# Patient Record
Sex: MALE | Race: WHITE | Employment: FULL TIME | URBAN - METROPOLITAN AREA
[De-identification: names, ages, dates, MRNs, and addresses within clinical notes are randomized per-mention and may not be internally consistent; named-entity substitution may affect disease eponyms.]

---

## 2019-09-26 ENCOUNTER — HOSPITAL ENCOUNTER (OUTPATIENT)
Age: 44
Discharge: HOME | End: 2019-09-28
Payer: COMMERCIAL

## 2019-09-26 PROCEDURE — 82785 ASSAY OF IGE: CPT

## 2019-09-26 PROCEDURE — 86003 ALLG SPEC IGE CRUDE XTRC EA: CPT

## 2019-10-03 LAB
Lab: NORMAL
REPORT: NORMAL
THIS TEST SENT TO: NORMAL

## 2021-02-09 DIAGNOSIS — G56.01 CARPAL TUNNEL SYNDROME OF RIGHT WRIST: Primary | ICD-10-CM

## 2021-02-10 ENCOUNTER — OFFICE VISIT (OUTPATIENT)
Dept: PHYSICAL MEDICINE AND REHAB | Age: 46
End: 2021-02-10
Payer: COMMERCIAL

## 2021-02-10 VITALS — BODY MASS INDEX: 30.36 KG/M2 | HEIGHT: 69 IN | WEIGHT: 205 LBS

## 2021-02-10 DIAGNOSIS — G56.01 CARPAL TUNNEL SYNDROME OF RIGHT WRIST: Primary | ICD-10-CM

## 2021-02-10 PROCEDURE — 99242 OFF/OP CONSLTJ NEW/EST SF 20: CPT | Performed by: PHYSICAL MEDICINE & REHABILITATION

## 2021-02-10 PROCEDURE — 95886 MUSC TEST DONE W/N TEST COMP: CPT | Performed by: PHYSICAL MEDICINE & REHABILITATION

## 2021-02-10 PROCEDURE — 95909 NRV CNDJ TST 5-6 STUDIES: CPT | Performed by: PHYSICAL MEDICINE & REHABILITATION

## 2021-02-10 NOTE — PATIENT INSTRUCTIONS
Electrodiagnotic Laboratory  Accredited by the [facility] with Exemplary status  SUSAN Lentz D.O. [facility]  [address]  Phone: [phone]  Fax: [phone]        Today you had an electrodiagnostic exam which included nerve conduction studies (NCS) and electromyography (EMG). This test evaluated the electrical activity of your nerves and muscles to help determine if you have a nerve or muscle disease. This test can help determine the location and type of a nerve or muscle problem. This will help your referring doctor diagnose your condition and determine the appropriate next step in your treatment plan. After your test:    1. There are no long lasting side effects of the test.     2. You may resume your normal activities without restrictions. 3.  Resume any medications that were stopped for the test.     4  If you have sore areas or bruising in your muscles where the needle was placed, apply a cold pack to the sore area for 15-20 minutes three to four times a day as needed for pain. The soreness should go away in about 1-2 days. 5. Your results were provided  Briefly at the end of your test and the final detailed report will be provided to your referring physician, and/or primary care physician and any other parties you requested within 1-2 days of the examination. You may wish to contact your referring provider after a few days to determine what they would like you to do next. 6.  Please call [phone] with any questions or concerns and if you develop increased body temperature/fever, swelling, tenderness, increased pain and/or drainage from the sites where the needle was placed. Thank you for choosing us for your health care needs.

## 2021-02-10 NOTE — PROGRESS NOTES
[address] [facility]  Electrodiagnostic Laboratory  *Accredited by the [address] [place] [address] with exemplary status  [address]  Phone: [phone]  Fax: [phone]    Referring Provider: Thomas Camacho CNP  Primary Care Physician: Chen Peng DO  Patient Name: Sarah Londono  Patient YOB: 1975  Gender: male  BMI: There is no height or weight on file to calculate BMI. There were no vitals taken for this visit. 2/10/2021    Description of clinical problem:   Chief Complaint   Patient presents with    Extremity Pain     sharp pain in wrist, eblow and shoulder. pain 4-5/10. pain with movement. 6years. no acute injury.  Numbness     sitting on elbow will cause numbness.  Extremity Weakness     pain will cause a decrease in  strength     Sensory NCS      Nerve / Sites Rec. Site Peak Lat PP Amp Segments Distance Velocity Temp. ms µV  cm m/s °C   R Median - Digit II (Antidromic)      Palm Dig II 1.93 18.8 Palm - Dig II 7 64 33.5      Wrist Dig II 3.91 14.2 Wrist - Dig II 14 46 33.5   R Ulnar - Digit V (Antidromic)      Wrist Dig V 3.28 15.9 Wrist - Dig V 14 55 33.6   R Radial - Anatomical snuff box (Forearm)      Forearm Wrist 2.29 13.0 Forearm - Wrist 10 60 33.8   R Dorsal ulnar cutaneous - Hand dorsum (Forearm)      Forearm Hand dorsum 2.19 25.6 Forearm - Hand dorsum 8 55 33.8       Motor NCS      Nerve / Sites Muscle Onset Amplitude Segments Distance Velocity Temp.     ms mV  cm m/s °C   R Median - APB      Palm APB 1.98 13.2 Palm - APB   33.3      Wrist APB 3.54 12.2 Wrist - Palm 8 51 33.6      Elbow APB 7.19 11.9 Elbow - Wrist 21 58 33.6   R Ulnar - ADM      Wrist ADM 2.55 15.5 Wrist - ADM 8  33.6      B. Elbow ADM 5.78 15.1 B. Elbow - Wrist 20 62 33.6      A. Elbow ADM 7.55 14.1 A. Elbow - B. Elbow 10 56 33.6   L Ulnar - ADM      Wrist ADM 2.19 14.5 Wrist - ADM 8  34      B. Elbow ADM 5.83 13.9 B. Elbow - Wrist 20 55 34.1 A.Elbow ADM 7.71 13.8 A. Elbow - B. Elbow 10 53 34.3       F Wave      Nerve Fmin % F    ms %   R Median - APB 28.33 90   R Ulnar - ADM 28.07 100   L Ulnar - ADM 28.75 100       EMG      EMG Summary Table     Spontaneous MUAP Recruitment   Muscle Nerve Roots IA Fib PSW Fasc Amp Dur. PPP Pattern   R. Biceps brachii Musculocutaneous C5-C6 N None None None N N N N   R. Triceps brachii Radial C6-C8 N None None None N N N N   R. Pronator teres Median C6-C7 N None None None N N N N   R. First dorsal interosseous Ulnar C8-T1 N None None None N N N N   R. Abductor pollicis brevis Median K8-T4 N None None None N N N N     Study Limitations:  none    Summary of Findings:   Nerve conduction studies:   · All nerve conduction studies listed in the table above were normal in latency, amplitude and conduction velocity. Needle EMG:   · Needle EMG was performed using a concentric needle. ? All muscles tested, as listed in the table above demonstrated normal amplitude, duration, phases and recruitment and no active denervation signs were seen. Diagnostic Interpretation: This study was normal.     Previous Study: no      Follow up EMG is recommended if clinically warranted. Technologist: Rodrigo Pedraza  Physician:    Annette Cope D.O., P.T. Board Certified Physical Medicine and Rehabilitation  Board Certified Electrodiagnostic Medicine      Nerve conduction studies and electromyography were performed according to our laboratory policies and procedures which can be provided upon request. All abnormal values are identified in the table.  Laboratory normal values can also be provided upon request.       Cc: IRWIN Senior DO

## 2021-02-11 NOTE — PROGRESS NOTES
[address] [facility]  Electrodiagnostic Laboratory  *Accredited by the [address] [place] [address] with exemplary status  [address]  Phone: [phone]  Fax: [phone]      Date of Examination: 02/11/21  Patient Name: Marielena Mendiola  is a 54y.o. year old male who was seen due to complaints of   Chief Complaint   Patient presents with    Extremity Pain     sharp pain in wrist, eblow and shoulder. pain 4-5/10. pain with movement. 6years. no acute injury.  Numbness     sitting on elbow will cause numbness.  Extremity Weakness     pain will cause a decrease in  strength     Physical Exam: MSK: There is no joint effusion, deformity, instability, swelling, erythema or warmth. AROM is full in the spine and extremities. Negative Tinel. Neurologic:  No focal sensorimotor deficit. Reflexes 2+ and symmetric. Gait is normal.    Impression:     1. Carpal tunnel syndrome of right wrist        Plan:   · EMG is indicated to evaluate the above diagnosis. Orders Placed This Encounter   Procedures    WY NEEDLE EMG EA EXTREMTY W/PARASPINL AREA COMPLETE    WY MOTOR &/SENS 5-6 NRV CNDJ PRECONF ELTRODE LIMB     · EMG was done today and showed a normal study. The patient was educated about the diagnosis and the prognosis. · Advised patient to follow up with referring provider. Thank you for allowing me to participate in the care of your patient.       Sincerely,     Louie Kenny

## 2021-02-12 DIAGNOSIS — G56.01 CARPAL TUNNEL SYNDROME OF RIGHT WRIST: ICD-10-CM

## 2024-04-26 ENCOUNTER — TRANSCRIBE ORDERS (OUTPATIENT)
Dept: ADMINISTRATIVE | Age: 49
End: 2024-04-26

## 2024-04-26 DIAGNOSIS — S46.811A TRAUMATIC RUPTURE OF SUPRASPINATUS TENDON OF RIGHT SHOULDER, INITIAL ENCOUNTER: Primary | ICD-10-CM

## 2024-05-04 ENCOUNTER — HOSPITAL ENCOUNTER (OUTPATIENT)
Dept: MRI IMAGING | Age: 49
End: 2024-05-04
Payer: OTHER MISCELLANEOUS

## 2024-05-04 DIAGNOSIS — S46.811A TRAUMATIC RUPTURE OF SUPRASPINATUS TENDON OF RIGHT SHOULDER, INITIAL ENCOUNTER: ICD-10-CM

## 2024-05-04 PROCEDURE — 73221 MRI JOINT UPR EXTREM W/O DYE: CPT

## 2025-02-11 ENCOUNTER — TELEPHONE (OUTPATIENT)
Dept: ENT CLINIC | Age: 50
End: 2025-02-11

## 2025-02-11 ENCOUNTER — OFFICE VISIT (OUTPATIENT)
Dept: ENT CLINIC | Age: 50
End: 2025-02-11
Payer: COMMERCIAL

## 2025-02-11 VITALS
TEMPERATURE: 97.6 F | DIASTOLIC BLOOD PRESSURE: 92 MMHG | BODY MASS INDEX: 28.71 KG/M2 | HEART RATE: 106 BPM | OXYGEN SATURATION: 97 % | WEIGHT: 193.8 LBS | HEIGHT: 69 IN | SYSTOLIC BLOOD PRESSURE: 138 MMHG

## 2025-02-11 DIAGNOSIS — K21.9 GASTROESOPHAGEAL REFLUX DISEASE, UNSPECIFIED WHETHER ESOPHAGITIS PRESENT: Primary | ICD-10-CM

## 2025-02-11 DIAGNOSIS — R06.2 WHEEZING: ICD-10-CM

## 2025-02-11 DIAGNOSIS — R09.82 POST-NASAL DRAINAGE: ICD-10-CM

## 2025-02-11 DIAGNOSIS — J30.9 ALLERGIC RHINITIS, UNSPECIFIED SEASONALITY, UNSPECIFIED TRIGGER: ICD-10-CM

## 2025-02-11 PROCEDURE — 99204 OFFICE O/P NEW MOD 45 MIN: CPT | Performed by: NURSE PRACTITIONER

## 2025-02-11 RX ORDER — FAMOTIDINE 10 MG
10 TABLET ORAL NIGHTLY PRN
COMMUNITY

## 2025-02-11 RX ORDER — AZELASTINE 1 MG/ML
2 SPRAY, METERED NASAL 2 TIMES DAILY
Qty: 30 ML | Refills: 1 | Status: SHIPPED | OUTPATIENT
Start: 2025-02-11

## 2025-02-11 RX ORDER — LISINOPRIL 20 MG/1
20 TABLET ORAL DAILY
COMMUNITY

## 2025-02-11 ASSESSMENT — ENCOUNTER SYMPTOMS
SINUS PRESSURE: 0
SINUS PAIN: 0
TROUBLE SWALLOWING: 1
STRIDOR: 0
WHEEZING: 1
SHORTNESS OF BREATH: 0
VOICE CHANGE: 0
RHINORRHEA: 1
EYES NEGATIVE: 1

## 2025-02-11 NOTE — PROGRESS NOTES
DEPARTMENT OF OTOLARYNGOLOGY  JACQUELYN LoyolaO., Ms. Ed.  JACQUELYN BeaulieuO.  Shad Trotter, MSN, FNP-C        Patient Name:  Juanjose Kelsey Jr  :  1975     CHIEF C/O:    Chief Complaint   Patient presents with    New Patient     NP PND, HX of allergy injs for 1 year, had a nasal swab and came back with streptococcus pneumoniae, and acinetobacter baumannii        HISTORY OBTAINED FROM:  patient    HISTORY OF PRESENT ILLNESS:       Juanjose is a 49 y.o. year old male, here today for:       Recurrent sinus infections, postnasal drainage, acid reflux and wheezing.  Patient states his symptoms have been present for many years.  He does have a long history of acid reflux with previous follow-ups with gastroenterology and dilations of the esophagus.  He is also been diagnosed with a hiatal hernia.  He currently takes Prilosec daily for acid reflux and denies any current noticeable acid reflux symptoms.  He also complains of persistent congestion with postnasal drainage symptoms.  He has tried Flonase in the past with Allegra-D but states no significant improvement when taking the medications.  He is also on inhalers for asthma due to wheezing.  He states that the wheezing comes from his throat and not his lungs and does not always see a significant amount of benefit from the inhalers.  He denies any recent fevers or recent antibiotics but has had frequent treatment of sinus infections in the past.  Denies any ear pain or pressure.  He denies any current difficulty swallowing or hoarseness.  He denies any tobacco history.           Past Medical History:   Diagnosis Date    Asthma     from allergies    Hypertension      History reviewed. No pertinent surgical history.    Current Outpatient Medications:     lisinopril (PRINIVIL;ZESTRIL) 20 MG tablet, Take 1 tablet by mouth daily, Disp: , Rfl:     famotidine (PEPCID) 10 MG tablet, Take 1 tablet by mouth nightly as needed, Disp: , Rfl:

## 2025-02-11 NOTE — TELEPHONE ENCOUNTER
Patient asking if you would send in refills for his Albuterol. You are not the prescriber for this so I did not send them.

## 2025-02-11 NOTE — TELEPHONE ENCOUNTER
Patient left voicemail regarding Omeprazole dose. States you advised him to increase to 40mg however that is the dose he is currently taking.

## 2025-02-11 NOTE — TELEPHONE ENCOUNTER
Called patient and advised he needs to call the prescribing physician for albuterol refill. Patient apologized and said he thought he left that message at his PCP office.

## 2025-02-11 NOTE — TELEPHONE ENCOUNTER
Called patient and advised per Soren Trotter:   Tell him to continue with that does then with the addition of gaviscon at bedtime as discussed.       Patient verbalized understanding

## 2025-02-24 ENCOUNTER — TELEPHONE (OUTPATIENT)
Dept: ENDOSCOPY | Age: 50
End: 2025-02-24

## 2025-02-26 ENCOUNTER — TELEPHONE (OUTPATIENT)
Dept: ENDOSCOPY | Age: 50
End: 2025-02-26

## 2025-03-26 ENCOUNTER — OFFICE VISIT (OUTPATIENT)
Dept: ENT CLINIC | Age: 50
End: 2025-03-26
Payer: COMMERCIAL

## 2025-03-26 VITALS
BODY MASS INDEX: 29.33 KG/M2 | WEIGHT: 198 LBS | DIASTOLIC BLOOD PRESSURE: 88 MMHG | SYSTOLIC BLOOD PRESSURE: 156 MMHG | HEART RATE: 71 BPM | HEIGHT: 69 IN

## 2025-03-26 DIAGNOSIS — R06.2 WHEEZING: ICD-10-CM

## 2025-03-26 DIAGNOSIS — R09.82 POST-NASAL DRAINAGE: ICD-10-CM

## 2025-03-26 DIAGNOSIS — K21.9 GASTROESOPHAGEAL REFLUX DISEASE, UNSPECIFIED WHETHER ESOPHAGITIS PRESENT: Primary | ICD-10-CM

## 2025-03-26 DIAGNOSIS — T78.1XXA GASTROINTESTINAL FOOD SENSITIVITY: ICD-10-CM

## 2025-03-26 DIAGNOSIS — J30.9 ALLERGIC RHINITIS, UNSPECIFIED SEASONALITY, UNSPECIFIED TRIGGER: ICD-10-CM

## 2025-03-26 PROCEDURE — 99213 OFFICE O/P EST LOW 20 MIN: CPT | Performed by: NURSE PRACTITIONER

## 2025-03-26 RX ORDER — SIMETHICONE 40MG/0.6ML
30 SUSPENSION, DROPS(FINAL DOSAGE FORM)(ML) ORAL 2 TIMES DAILY PRN
Qty: 355 ML | Refills: 1 | Status: SHIPPED | OUTPATIENT
Start: 2025-03-26 | End: 2025-04-25

## 2025-03-26 RX ORDER — FLUTICASONE PROPIONATE AND SALMETEROL 250; 50 UG/1; UG/1
POWDER RESPIRATORY (INHALATION)
COMMUNITY
Start: 2025-03-10

## 2025-03-26 RX ORDER — NEBIVOLOL 10 MG/1
10 TABLET ORAL DAILY
COMMUNITY
Start: 2025-03-10

## 2025-03-26 ASSESSMENT — ENCOUNTER SYMPTOMS
RHINORRHEA: 0
VOICE CHANGE: 0
EYES NEGATIVE: 1
WHEEZING: 1
STRIDOR: 0
TROUBLE SWALLOWING: 1
SINUS PAIN: 0
SINUS PRESSURE: 0
SHORTNESS OF BREATH: 0

## 2025-03-26 NOTE — PROGRESS NOTES
DEPARTMENT OF OTOLARYNGOLOGY  JACQUELYN LoyolaO., Ms. Ed.  JACQUELYN BeaulieuO.  Shad Trotter, MSN, FNP-C        Patient Name:  Juanjose Kelsey Jr  :  1975     CHIEF C/O:    Chief Complaint   Patient presents with    Follow-up     6 wk PND       HISTORY OBTAINED FROM:  patient    HISTORY OF PRESENT ILLNESS:       Juanjose is a 49 y.o. year old male, here today for follow up of:       History of Present Illness  The patient presents for evaluation of acid reflux, migraines, and respiratory issues.    He has been managing his acid reflux with omeprazole and Gaviscon, which he finds beneficial. Despite these treatments, he continues to experience gas and acid reflux, regardless of his dietary intake. He abstained from beer for approximately 3.5 weeks but resumed consumption recently, leading to stomach discomfort. He is considering the possibility that his long-term consumption of Natural Light beer may be a contributing factor to his symptoms. He describes a tingling sensation associated with his acid reflux, which he believes may be related to anxiety. He recalls a period of wellness during the summer, which was disrupted by a sinus infection around [facility]. He was prescribed Mucinex and an anti-inflammatory medication, which he believes exacerbated his stomach issues. He is seeking a prescription for Maalox and Gaviscon to facilitate travel with larger quantities of these over-the-counter medications. He also requests a prescription for Mylanta Maximum Strength Suspension 355 mL bottle twice daily as needed. He has previously consulted a gastroenterologist but did not undergo allergy testing.    He experiences migraines approximately every 3 to 4 months, characterized by unusual sensations in his eyes and mild headaches. His wife has suggested that these symptoms may be indicative of migraines. He has not previously consulted a neurologist for these symptoms. He reports that his

## 2025-03-27 ENCOUNTER — HOSPITAL ENCOUNTER (OUTPATIENT)
Age: 50
Discharge: HOME | End: 2025-03-27
Payer: COMMERCIAL

## 2025-03-27 DIAGNOSIS — K21.9 GASTROESOPHAGEAL REFLUX DISEASE, UNSPECIFIED WHETHER ESOPHAGITIS PRESENT: ICD-10-CM

## 2025-03-27 DIAGNOSIS — J30.9 ALLERGIC RHINITIS, UNSPECIFIED SEASONALITY, UNSPECIFIED TRIGGER: ICD-10-CM

## 2025-03-27 DIAGNOSIS — T78.1XXA GASTROINTESTINAL FOOD SENSITIVITY: ICD-10-CM

## 2025-03-27 PROCEDURE — 36415 COLL VENOUS BLD VENIPUNCTURE: CPT

## 2025-03-28 LAB
SEND OUT REPORT: NORMAL
TEST NAME: NORMAL

## 2025-04-01 RX ORDER — AZELASTINE 1 MG/ML
SPRAY, METERED NASAL
Qty: 90 ML | Refills: 1 | Status: SHIPPED | OUTPATIENT
Start: 2025-04-01

## 2025-04-11 ENCOUNTER — HOSPITAL ENCOUNTER (OUTPATIENT)
Age: 50
Discharge: HOME | End: 2025-04-11
Payer: COMMERCIAL

## 2025-04-11 LAB
ALBUMIN SERPL-MCNC: 4.5 G/DL (ref 3.5–5.2)
ALP SERPL-CCNC: 78 U/L (ref 40–129)
ALT SERPL-CCNC: 13 U/L (ref 0–40)
ANION GAP SERPL CALCULATED.3IONS-SCNC: 11 MMOL/L (ref 7–16)
AST SERPL-CCNC: 16 U/L (ref 0–39)
BASOPHILS # BLD: 0.08 K/UL (ref 0–0.2)
BASOPHILS NFR BLD: 1 % (ref 0–2)
BILIRUB SERPL-MCNC: 0.7 MG/DL (ref 0–1.2)
BUN SERPL-MCNC: 14 MG/DL (ref 6–20)
CALCIUM SERPL-MCNC: 10.2 MG/DL (ref 8.6–10.2)
CHLORIDE SERPL-SCNC: 104 MMOL/L (ref 98–107)
CHOLEST SERPL-MCNC: 202 MG/DL
CO2 SERPL-SCNC: 27 MMOL/L (ref 22–29)
CREAT SERPL-MCNC: 0.9 MG/DL (ref 0.7–1.2)
EOSINOPHIL # BLD: 0.13 K/UL (ref 0.05–0.5)
EOSINOPHILS RELATIVE PERCENT: 2 % (ref 0–6)
ERYTHROCYTE [DISTWIDTH] IN BLOOD BY AUTOMATED COUNT: 11.9 % (ref 11.5–15)
GFR, ESTIMATED: >90 ML/MIN/1.73M2
GLUCOSE SERPL-MCNC: 99 MG/DL (ref 74–99)
HCT VFR BLD AUTO: 46.4 % (ref 37–54)
HDLC SERPL-MCNC: 70 MG/DL
HGB BLD-MCNC: 15.8 G/DL (ref 12.5–16.5)
IMM GRANULOCYTES # BLD AUTO: <0.03 K/UL (ref 0–0.58)
IMM GRANULOCYTES NFR BLD: 0 % (ref 0–5)
LDLC SERPL CALC-MCNC: 116 MG/DL
LYMPHOCYTES NFR BLD: 1.24 K/UL (ref 1.5–4)
LYMPHOCYTES RELATIVE PERCENT: 19 % (ref 20–42)
MCH RBC QN AUTO: 30.7 PG (ref 26–35)
MCHC RBC AUTO-ENTMCNC: 34.1 G/DL (ref 32–34.5)
MCV RBC AUTO: 90.1 FL (ref 80–99.9)
MONOCYTES NFR BLD: 0.59 K/UL (ref 0.1–0.95)
MONOCYTES NFR BLD: 9 % (ref 2–12)
NEUTROPHILS NFR BLD: 69 % (ref 43–80)
NEUTS SEG NFR BLD: 4.58 K/UL (ref 1.8–7.3)
PLATELET # BLD AUTO: 287 K/UL (ref 130–450)
PMV BLD AUTO: 10.4 FL (ref 7–12)
POTASSIUM SERPL-SCNC: 4.4 MMOL/L (ref 3.5–5)
PROT SERPL-MCNC: 7.4 G/DL (ref 6.4–8.3)
PSA SERPL-MCNC: 0.59 NG/ML (ref 0–4)
RBC # BLD AUTO: 5.15 M/UL (ref 3.8–5.8)
SODIUM SERPL-SCNC: 142 MMOL/L (ref 132–146)
TRIGL SERPL-MCNC: 78 MG/DL
TSH SERPL DL<=0.05 MIU/L-ACNC: 1.27 UIU/ML (ref 0.27–4.2)
VLDLC SERPL CALC-MCNC: 16 MG/DL
WBC OTHER # BLD: 6.6 K/UL (ref 4.5–11.5)

## 2025-04-11 PROCEDURE — 85025 COMPLETE CBC W/AUTO DIFF WBC: CPT

## 2025-04-11 PROCEDURE — 80061 LIPID PANEL: CPT

## 2025-04-11 PROCEDURE — 84443 ASSAY THYROID STIM HORMONE: CPT

## 2025-04-11 PROCEDURE — G0103 PSA SCREENING: HCPCS

## 2025-04-11 PROCEDURE — 80053 COMPREHEN METABOLIC PANEL: CPT

## 2025-04-11 PROCEDURE — 36415 COLL VENOUS BLD VENIPUNCTURE: CPT

## 2025-04-25 LAB
SEND OUT REPORT: NORMAL
TEST NAME: NORMAL

## 2025-05-08 ENCOUNTER — OFFICE VISIT (OUTPATIENT)
Dept: ENT CLINIC | Age: 50
End: 2025-05-08
Payer: COMMERCIAL

## 2025-05-08 VITALS
HEART RATE: 92 BPM | SYSTOLIC BLOOD PRESSURE: 149 MMHG | HEIGHT: 69 IN | BODY MASS INDEX: 27.16 KG/M2 | OXYGEN SATURATION: 98 % | WEIGHT: 183.4 LBS | TEMPERATURE: 98.2 F | DIASTOLIC BLOOD PRESSURE: 81 MMHG

## 2025-05-08 DIAGNOSIS — J30.9 ALLERGIC RHINITIS, UNSPECIFIED SEASONALITY, UNSPECIFIED TRIGGER: ICD-10-CM

## 2025-05-08 DIAGNOSIS — R09.82 POST-NASAL DRAINAGE: ICD-10-CM

## 2025-05-08 DIAGNOSIS — T78.1XXA GASTROINTESTINAL FOOD SENSITIVITY: ICD-10-CM

## 2025-05-08 DIAGNOSIS — K21.9 GASTROESOPHAGEAL REFLUX DISEASE, UNSPECIFIED WHETHER ESOPHAGITIS PRESENT: Primary | ICD-10-CM

## 2025-05-08 PROCEDURE — 99213 OFFICE O/P EST LOW 20 MIN: CPT | Performed by: NURSE PRACTITIONER

## 2025-05-08 ASSESSMENT — ENCOUNTER SYMPTOMS
SINUS PAIN: 0
EYES NEGATIVE: 1
TROUBLE SWALLOWING: 0
VOICE CHANGE: 0
SINUS PRESSURE: 0
RHINORRHEA: 0
STRIDOR: 0
WHEEZING: 0
SHORTNESS OF BREATH: 0

## 2025-05-08 NOTE — PROGRESS NOTES
trees, Berm[place] grass, brown grass, oats, potatoes, soybeans, more grasses and trees, barley, rice, rye, wheat, celery, parsley, tomatoes, and tuna.  Many other environmental allergies    IMPRESSION/PLAN:    Juanjose was seen today for follow-up.    Diagnoses and all orders for this visit:    Gastroesophageal reflux disease, unspecified whether esophagitis present    Allergic rhinitis, unspecified seasonality, unspecified trigger    Post-nasal drainage    Gastrointestinal food sensitivity          Assessment & Plan  1. Gastroesophageal Reflux Disease (GERD).  His sinus condition is showing signs of improvement. The reflux symptoms are also improving due to dietary modifications. He was advised to continue with these dietary changes to further alleviate his symptoms. He was advised to consume leaner cuts of meat and to use corn starch instead of flour for gravy preparation. A refill of Pepcid 20 mg was provided. He was also advised to continue taking omeprazole 40 mg daily and to use Gaviscon as needed.    2. Gastritis.  He was educated on the difference between gastritis and GERD, emphasizing that gastritis is inflammation of the stomach lining, while GERD involves acid reflux into the esophagus. He was advised to avoid fatty foods and to continue using Gaviscon as needed to manage symptoms.    3. Allergies.  He was informed that he has sensitivities rather than allergies to certain foods. He was advised to consume these foods in moderation. A comprehensive list of his allergy testing results will be provided for his reference.    Follow-up  The patient will follow up in 3 months.      Shad Trotter, MSN, FNP-C  [facility] - Ear, Nose [facility] Th[facility]    The information contained in this note has been dictated using drug and medical speech recognition software and may contain errors

## 2025-05-19 ENCOUNTER — TELEPHONE (OUTPATIENT)
Dept: ENT CLINIC | Age: 50
End: 2025-05-19

## 2025-05-19 DIAGNOSIS — Z91.09 ENVIRONMENTAL ALLERGIES: ICD-10-CM

## 2025-05-19 DIAGNOSIS — T78.1XXA GASTROINTESTINAL FOOD SENSITIVITY: Primary | ICD-10-CM

## 2025-05-21 NOTE — TELEPHONE ENCOUNTER
Patient called asking if he can have allergy testing ordered, specifically for red meat. Please advise.  
Referral placed for Dr. Anand  
Laceration    A laceration is a cut that goes through all of the layers of the skin and into the tissue that is right under the skin. Some lacerations heal on their own. Others need to be closed with skin adhesive strips, skin glue, stitches (sutures), or staples. Proper laceration care minimizes the risk of infection and helps the laceration to heal better.  If non-absorbable stitches or staples have been placed, they must be taken out within the time frame instructed by your healthcare provider.    SEEK IMMEDIATE MEDICAL CARE IF YOU HAVE ANY OF THE FOLLOWING SYMPTOMS: swelling around the wound, worsening pain, drainage from the wound, red streaking going away from your wound, inability to move finger or toe near the laceration, or discoloration of skin near the laceration.

## 2025-06-11 RX ORDER — OMEPRAZOLE 20 MG/1
40 CAPSULE, DELAYED RELEASE ORAL DAILY
Qty: 60 CAPSULE | Refills: 1 | Status: SHIPPED | OUTPATIENT
Start: 2025-06-11

## 2025-06-18 ENCOUNTER — TRANSCRIBE ORDERS (OUTPATIENT)
Dept: ADMINISTRATIVE | Age: 50
End: 2025-06-18

## 2025-06-18 DIAGNOSIS — R10.84 ABDOMINAL PAIN, GENERALIZED: Primary | ICD-10-CM

## 2025-07-25 ENCOUNTER — HOSPITAL ENCOUNTER (OUTPATIENT)
Dept: ULTRASOUND IMAGING | Age: 50
Discharge: HOME | End: 2025-07-27
Attending: SPECIALIST
Payer: COMMERCIAL

## 2025-07-25 DIAGNOSIS — R10.84 ABDOMINAL PAIN, GENERALIZED: ICD-10-CM

## 2025-07-25 PROCEDURE — 76705 ECHO EXAM OF ABDOMEN: CPT

## 2025-08-13 ENCOUNTER — OFFICE VISIT (OUTPATIENT)
Dept: ENT CLINIC | Age: 50
End: 2025-08-13
Payer: COMMERCIAL

## 2025-08-13 VITALS
HEIGHT: 69 IN | SYSTOLIC BLOOD PRESSURE: 163 MMHG | BODY MASS INDEX: 26.84 KG/M2 | HEART RATE: 88 BPM | OXYGEN SATURATION: 98 % | DIASTOLIC BLOOD PRESSURE: 100 MMHG | RESPIRATION RATE: 16 BRPM | WEIGHT: 181.2 LBS

## 2025-08-13 DIAGNOSIS — K21.9 GASTROESOPHAGEAL REFLUX DISEASE, UNSPECIFIED WHETHER ESOPHAGITIS PRESENT: ICD-10-CM

## 2025-08-13 DIAGNOSIS — R09.82 POST-NASAL DRAINAGE: ICD-10-CM

## 2025-08-13 DIAGNOSIS — T78.1XXA GASTROINTESTINAL FOOD SENSITIVITY: Primary | ICD-10-CM

## 2025-08-13 DIAGNOSIS — J30.9 ALLERGIC RHINITIS, UNSPECIFIED SEASONALITY, UNSPECIFIED TRIGGER: ICD-10-CM

## 2025-08-13 DIAGNOSIS — Z91.09 ENVIRONMENTAL ALLERGIES: ICD-10-CM

## 2025-08-13 PROCEDURE — 99213 OFFICE O/P EST LOW 20 MIN: CPT | Performed by: NURSE PRACTITIONER

## 2025-08-13 RX ORDER — FAMOTIDINE 40 MG/1
40 TABLET, FILM COATED ORAL EVERY EVENING
Qty: 30 TABLET | Refills: 3 | Status: SHIPPED | OUTPATIENT
Start: 2025-08-13

## 2025-08-13 ASSESSMENT — ENCOUNTER SYMPTOMS
VOICE CHANGE: 0
WHEEZING: 0
EYES NEGATIVE: 1
SHORTNESS OF BREATH: 0
TROUBLE SWALLOWING: 0
STRIDOR: 0
SINUS PRESSURE: 0
SINUS PAIN: 0
RHINORRHEA: 0

## 2025-08-22 ENCOUNTER — TRANSCRIBE ORDERS (OUTPATIENT)
Dept: ADMINISTRATIVE | Age: 50
End: 2025-08-22

## 2025-08-22 DIAGNOSIS — R13.19 OTHER DYSPHAGIA: Primary | ICD-10-CM

## 2025-08-22 DIAGNOSIS — K21.9 GASTROESOPHAGEAL REFLUX DISEASE, UNSPECIFIED WHETHER ESOPHAGITIS PRESENT: ICD-10-CM

## 2025-08-29 ENCOUNTER — TELEPHONE (OUTPATIENT)
Dept: ENDOSCOPY | Age: 50
End: 2025-08-29

## 2025-09-05 ENCOUNTER — HOSPITAL ENCOUNTER (OUTPATIENT)
Age: 50
Setting detail: OUTPATIENT SURGERY
Discharge: HOME | End: 2025-09-05
Attending: INTERNAL MEDICINE | Admitting: INTERNAL MEDICINE
Payer: COMMERCIAL

## 2025-09-05 PROCEDURE — 3609019000 HC CAPSULE ENDOSCOPY: Performed by: INTERNAL MEDICINE

## 2025-09-05 PROCEDURE — 6370000000 HC RX 637 (ALT 250 FOR IP): Performed by: INTERNAL MEDICINE

## 2025-09-05 PROCEDURE — 3609015500 HC GASTRIC/DUODENAL MOTILITY &/OR MANOMETRY STUDY: Performed by: INTERNAL MEDICINE

## 2025-09-05 RX ORDER — LIDOCAINE HYDROCHLORIDE 20 MG/ML
JELLY TOPICAL PRN
Status: DISCONTINUED | OUTPATIENT
Start: 2025-09-05 | End: 2025-09-05 | Stop reason: ALTCHOICE